# Patient Record
Sex: FEMALE | Race: BLACK OR AFRICAN AMERICAN | Employment: FULL TIME | URBAN - METROPOLITAN AREA
[De-identification: names, ages, dates, MRNs, and addresses within clinical notes are randomized per-mention and may not be internally consistent; named-entity substitution may affect disease eponyms.]

---

## 2017-07-17 ENCOUNTER — HOSPITAL ENCOUNTER (EMERGENCY)
Age: 27
Discharge: HOME | End: 2017-07-17
Attending: EMERGENCY MEDICINE
Payer: SELF-PAY

## 2017-07-17 VITALS
BODY MASS INDEX: 22.14 KG/M2 | TEMPERATURE: 98.2 F | HEART RATE: 74 BPM | OXYGEN SATURATION: 99 % | DIASTOLIC BLOOD PRESSURE: 70 MMHG | WEIGHT: 125 LBS | RESPIRATION RATE: 12 BRPM | SYSTOLIC BLOOD PRESSURE: 111 MMHG

## 2017-07-17 DIAGNOSIS — N76.0 BV (BACTERIAL VAGINOSIS): Primary | ICD-10-CM

## 2017-07-17 DIAGNOSIS — B96.89 BV (BACTERIAL VAGINOSIS): Primary | ICD-10-CM

## 2017-07-17 DIAGNOSIS — B37.31 YEAST VAGINITIS: ICD-10-CM

## 2017-07-17 DIAGNOSIS — N89.8 VAGINAL DISCHARGE: ICD-10-CM

## 2017-07-17 LAB
APPEARANCE UR: ABNORMAL
BACTERIA URNS QL MICRO: ABNORMAL /HPF
BILIRUB UR QL: NEGATIVE
C TRACH RRNA SPEC QL NAA+PROBE: NEGATIVE
COLOR UR: YELLOW
EPITH CASTS URNS QL MICRO: ABNORMAL /LPF (ref 0–5)
GLUCOSE UR STRIP.AUTO-MCNC: NEGATIVE MG/DL
HCG UR QL: NEGATIVE
HGB UR QL STRIP: NEGATIVE
KETONES UR QL STRIP.AUTO: NEGATIVE MG/DL
LEUKOCYTE ESTERASE UR QL STRIP.AUTO: ABNORMAL
N GONORRHOEA RRNA SPEC QL NAA+PROBE: NEGATIVE
NITRITE UR QL STRIP.AUTO: NEGATIVE
PH UR STRIP: 6 [PH] (ref 5–8)
PROT UR STRIP-MCNC: NEGATIVE MG/DL
RBC #/AREA URNS HPF: ABNORMAL /HPF (ref 0–5)
SERVICE CMNT-IMP: NORMAL
SP GR UR REFRACTOMETRY: 1.01 (ref 1–1.03)
SPECIMEN SOURCE: NORMAL
UROBILINOGEN UR QL STRIP.AUTO: 0.2 EU/DL (ref 0.2–1)
WBC URNS QL MICRO: ABNORMAL /HPF (ref 0–4)
WET PREP GENITAL: NORMAL

## 2017-07-17 PROCEDURE — 99284 EMERGENCY DEPT VISIT MOD MDM: CPT

## 2017-07-17 PROCEDURE — 96372 THER/PROPH/DIAG INJ SC/IM: CPT

## 2017-07-17 PROCEDURE — 74011250636 HC RX REV CODE- 250/636: Performed by: EMERGENCY MEDICINE

## 2017-07-17 PROCEDURE — 74011000250 HC RX REV CODE- 250: Performed by: EMERGENCY MEDICINE

## 2017-07-17 PROCEDURE — 87210 SMEAR WET MOUNT SALINE/INK: CPT | Performed by: EMERGENCY MEDICINE

## 2017-07-17 PROCEDURE — 74011250637 HC RX REV CODE- 250/637: Performed by: EMERGENCY MEDICINE

## 2017-07-17 PROCEDURE — 81025 URINE PREGNANCY TEST: CPT | Performed by: EMERGENCY MEDICINE

## 2017-07-17 PROCEDURE — 87491 CHLMYD TRACH DNA AMP PROBE: CPT | Performed by: EMERGENCY MEDICINE

## 2017-07-17 PROCEDURE — 81001 URINALYSIS AUTO W/SCOPE: CPT | Performed by: EMERGENCY MEDICINE

## 2017-07-17 RX ORDER — AZITHROMYCIN 250 MG/1
1000 TABLET, FILM COATED ORAL
Status: COMPLETED | OUTPATIENT
Start: 2017-07-17 | End: 2017-07-17

## 2017-07-17 RX ORDER — CLINDAMYCIN PHOSPHATE 20 MG/G
1 CREAM VAGINAL
Qty: 40 G | Refills: 0 | Status: SHIPPED | OUTPATIENT
Start: 2017-07-17 | End: 2017-07-24

## 2017-07-17 RX ADMIN — AZITHROMYCIN 1000 MG: 250 TABLET, FILM COATED ORAL at 03:25

## 2017-07-17 RX ADMIN — LIDOCAINE HYDROCHLORIDE 250 MG: 10 INJECTION, SOLUTION EPIDURAL; INFILTRATION; INTRACAUDAL; PERINEURAL at 03:24

## 2017-07-17 NOTE — ED NOTES
Patient states that she has received the rocephin shot and azithromycin in the past without any reaction.

## 2017-07-17 NOTE — ED TRIAGE NOTES
States she found out her significant was diagnosed with chlamydia; denies S/SX but states sensation in groin feels different without discomfort.   Has white discharge

## 2017-07-17 NOTE — DISCHARGE INSTRUCTIONS
Bacterial Vaginosis: Care Instructions  Your Care Instructions    Bacterial vaginosis is a type of vaginal infection. It is caused by excess growth of certain bacteria that are normally found in the vagina. Symptoms can include itching, swelling, pain when you urinate or have sex, and a gray or yellow discharge with a \"fishy\" odor. It is not considered an infection that is spread through sexual contact. Although symptoms can be annoying and uncomfortable, bacterial vaginosis does not usually cause other health problems. However, if you have it while you are pregnant, it can cause complications. While the infection may go away on its own, most doctors use antibiotics to treat it. You may have been prescribed pills or vaginal cream. With treatment, bacterial vaginosis usually clears up in 5 to 7 days. Follow-up care is a key part of your treatment and safety. Be sure to make and go to all appointments, and call your doctor if you are having problems. It's also a good idea to know your test results and keep a list of the medicines you take. How can you care for yourself at home? · Take your antibiotics as directed. Do not stop taking them just because you feel better. You need to take the full course of antibiotics. · Do not eat or drink anything that contains alcohol if you are taking metronidazole (Flagyl). · Keep using your medicine if you start your period. Use pads instead of tampons while using a vaginal cream or suppository. Tampons can absorb the medicine. · Wear loose cotton clothing. Do not wear nylon and other materials that hold body heat and moisture close to the skin. · Do not scratch. Relieve itching with a cold pack or a cool bath. · Do not wash your vaginal area more than once a day. Use plain water or a mild, unscented soap. Do not douche. When should you call for help?   Watch closely for changes in your health, and be sure to contact your doctor if:  · You have unexpected vaginal bleeding. · You have a fever. · You have new or increased pain in your vagina or pelvis. · You are not getting better after 1 week. · Your symptoms return after you finish the course of your medicine. Where can you learn more? Go to http://raya-china.info/. Tod Holly in the search box to learn more about \"Bacterial Vaginosis: Care Instructions. \"  Current as of: October 13, 2016  Content Version: 11.3  © 8653-4943 [employer]. Care instructions adapted under license by [employer] (which disclaims liability or warranty for this information). If you have questions about a medical condition or this instruction, always ask your healthcare professional. [address] any warranty or liability for your use of this information. Candidiasis: Care Instructions  Your Care Instructions  Candidiasis (say \"rtr-bwn-YH-uh-onelia\") is a yeast infection. Yeast normally lives in your body. But it can cause problems if your body's defenses don't work as they should. Some medicines can increase your chance of getting a yeast infection. These include antibiotics, steroids, and cancer drugs. And some diseases like AIDS and diabetes can make you more likely to get yeast infections. There are different types of yeast infections. Areli Epp is a yeast infection in the mouth. It usually occurs in people with weak immune systems. It causes white patches inside the mouth and throat. Yeast infections of the skin usually occur in skin folds where the skin stays moist. They cause red, oozing patches on your skin. Babies can get these infections under the diaper. People who often wear gloves can get them on their hands. Many women get vaginal yeast infections. They are most common when women take antibiotics. These infections can cause the vagina to itch and burn. They also cause white discharge that looks like cottage cheese. In rare cases, yeast infects the blood. This can cause serious disease. This kind of infection is treated with medicine given through a needle into a vein (IV). After you start treatment, a yeast infection usually goes away quickly. But if your immune system is weak, the infection may come back. Tell your doctor if you get yeast infections often. Follow-up care is a key part of your treatment and safety. Be sure to make and go to all appointments, and call your doctor if you are having problems. It's also a good idea to know your test results and keep a list of the medicines you take. How can you care for yourself at home? · Take your medicines exactly as prescribed. Call your doctor if you think you are having a problem with your medicine. · Use antibiotics only as directed by your doctor. · Eat yogurt with live cultures. It has bacteria called lactobacillus. It may help prevent some types of yeast infections. · Keep your skin clean and dry. Put powder on moist places. · If you are using a cream or suppository to treat a vaginal yeast infection, don't use condoms or a diaphragm. Use a different type of birth control. · Eat a healthy diet and get regular exercise. This will help keep your immune system strong. When should you call for help? Call your doctor now or seek immediate medical care if:  · You have a fever. · You are pregnant and have signs of a vaginal or urinary tract infection such as:  ¨ Severe itching in your vagina. ¨ Pain during sex or when you urinate. ¨ Unusual discharge from your vagina. ¨ A frequent urge to urinate. ¨ Urine that is cloudy or smells bad. Watch closely for changes in your health, and be sure to contact your doctor if:  · You do not get better as expected. Where can you learn more? Go to http://raya-china.info/. Enter G578 in the search box to learn more about \"Candidiasis: Care Instructions. \"  Current as of: October 13, 2016  Content Version: 11.3  © 5650-0033 [employer]. Care instructions adapted under license by [employer] (which disclaims liability or warranty for this information). If you have questions about a medical condition or this instruction, always ask your healthcare professional. [address] any warranty or liability for your use of this information.

## 2017-07-17 NOTE — ED PROVIDER NOTES
HPI Comments: 2:44 AM: Hailey Farley is a 24 y.o. female presenting to the ED after finding out that her sexual partner was diagnosed with an STD. Pt states she has \"been feeling weird\". States she was previously diagnosed with chlamydia and was treated. She admits to mild vaginal discharge with no odor. Denies exacerbating or relieving factors of sx. Denies fever, chills, CP, SOB, abd pain, nausea, vomiting, diarrhea, headache, coughing. Patient is a 24 y.o. female presenting with STD exposure. Exposure to STD   Associated symptoms include vaginal discharge. Pertinent negatives include no nausea and no abdominal pain. Past Medical History:   Diagnosis Date    Anemia     Ectopic pregnancy     Miscarriage        History reviewed. No pertinent surgical history. Family History:   Problem Relation Age of Onset    Heart Disease Mother     Asthma Mother     Hypertension Father        Social History     Social History    Marital status: SINGLE     Spouse name: N/A    Number of children: N/A    Years of education: N/A     Occupational History    Not on file. Social History Main Topics    Smoking status: Current Some Day Smoker     Packs/day: 0.25    Smokeless tobacco: Not on file    Alcohol use Yes      Comment: social    Drug use: No    Sexual activity: Yes     Birth control/ protection: None     Other Topics Concern    Not on file     Social History Narrative         ALLERGIES: Amoxicillin; Neosporin [neomycin-bacitracin-polymyxin]; Pcn [penicillins]; and Sulfa (sulfonamide antibiotics)    Review of Systems   Constitutional: Negative for fever. HENT: Negative for sore throat. Eyes: Negative for redness. Respiratory: Negative for shortness of breath and wheezing. Gastrointestinal: Negative for abdominal pain and nausea. Genitourinary: Positive for vaginal discharge. Negative for dysuria. Musculoskeletal: Negative for neck stiffness. Skin: Negative for pallor. Neurological: Negative for headaches. Hematological: Does not bruise/bleed easily. All other systems reviewed and are negative. Vitals:    07/17/17 0235   BP: 111/70   Pulse: 74   Resp: 12   Temp: 98.2 °F (36.8 °C)   SpO2: 99%   Weight: 56.7 kg (125 lb)            Physical Exam   Constitutional: She is oriented to person, place, and time. She appears well-developed and well-nourished. No distress. HENT:   Head: Normocephalic and atraumatic. Mouth/Throat: Oropharynx is clear and moist.   Eyes: Conjunctivae are normal. Pupils are equal, round, and reactive to light. No scleral icterus. Neck: Normal range of motion. Neck supple. Cardiovascular: Intact distal pulses. Capillary refill < 3 seconds   Pulmonary/Chest: Effort normal and breath sounds normal. No respiratory distress. She has no wheezes. Abdominal: Soft. Bowel sounds are normal. She exhibits no distension. There is no tenderness. Musculoskeletal: Normal range of motion. She exhibits no edema. Lymphadenopathy:     She has no cervical adenopathy. Neurological: She is alert and oriented to person, place, and time. No cranial nerve deficit. Skin: Skin is warm and dry. She is not diaphoretic. Nursing note and vitals reviewed. MDM  Number of Diagnoses or Management Options  BV (bacterial vaginosis):   Vaginal discharge:   Yeast vaginitis:   Diagnosis management comments: Exposure to STD    Pelvic exam, UA, gyn cultures    Pt is allergic amoxicillin and neosporin. According to Saleem Aly notes she gets hives with medications. She has gotten Rocephin in the past with no reaction. Will give a dose of Rocephin in the ED. Advised pt to follow up regarding cultures. If cultures are positive, partners must be treated. Pt would like to be treated pending cultures. Pt is breastfeeding. I called [facility] Pharmacist, Mayelin Barrett,  to discuss appropriate yeast infection and BV meds to prescribe.  Can use nystatin vaginal and clindamycin vaginal    I have reassessed the patient. I have discussed the workup, results and plan with the patient and patient is in agreement. Patient is feeling better. Patient will be prescribed nystatin vaginal and clindamycin vaginal.  Patient was discharge in stable condition. Patient was given outpatient follow up. Patient is to return to emergency department if any new or worsening condition.           Amount and/or Complexity of Data Reviewed  Clinical lab tests: ordered and reviewed  Tests in the medicine section of CPT®: ordered and reviewed    Patient Progress  Patient progress: improved    ED Course     Vitals:  Patient Vitals for the past 12 hrs:   Temp Pulse Resp BP SpO2   07/17/17 0235 98.2 °F (36.8 °C) 74 12 111/70 99 %       Medications Ordered:  Medications   cefTRIAXone (ROCEPHIN) 250 mg in lidocaine (PF) (XYLOCAINE) 10 mg/mL (1 %) IM injection (250 mg IntraMUSCular Given 7/17/17 0324)   azithromycin (ZITHROMAX) tablet 1,000 mg (1,000 mg Oral Given 7/17/17 0325)         Lab Findings:  Recent Results (from the past 12 hour(s))   URINALYSIS W/ RFLX MICROSCOPIC    Collection Time: 07/17/17  2:30 AM   Result Value Ref Range    Color YELLOW      Appearance CLOUDY      Specific gravity 1.011 1.005 - 1.030      pH (UA) 6.0 5.0 - 8.0      Protein NEGATIVE  NEG mg/dL    Glucose NEGATIVE  NEG mg/dL    Ketone NEGATIVE  NEG mg/dL    Bilirubin NEGATIVE  NEG      Blood NEGATIVE  NEG      Urobilinogen 0.2 0.2 - 1.0 EU/dL    Nitrites NEGATIVE  NEG      Leukocyte Esterase TRACE (A) NEG     HCG URINE, QL    Collection Time: 07/17/17  2:30 AM   Result Value Ref Range    HCG urine, Ql. NEGATIVE  NEG     URINE MICROSCOPIC ONLY    Collection Time: 07/17/17  2:30 AM   Result Value Ref Range    WBC 0 to 3 0 - 4 /hpf    RBC 0 to 3 0 - 5 /hpf    Epithelial cells 3+ 0 - 5 /lpf    Bacteria 1+ (A) NEG /hpf   WET PREP    Collection Time: 07/17/17  3:00 AM   Result Value Ref Range    Special Requests: NO SPECIAL REQUESTS      Wet prep MANY  CLUE CELLS PRESENT        Wet prep MODERATE  YEAST        Wet prep FEW  YEAST WITH PSEUDOHYPHAE        Wet prep NO TRICHOMONAS SEEN         Reevaluation of the patient:    3:54 AM: Discussed all results with pt. Discussed Rx instructions, F/U instructions, and red flags for return to the ED. Pt verbalized understanding     Diagnosis:   1. BV (bacterial vaginosis)    2. Yeast vaginitis    3. Vaginal discharge        Disposition: Discharge home    Follow-up Information     Follow up With Details Comments [address], [place] Call in 2 days  [id number] [facility] [address] [facility]      [address] [facility] EMERGENCY DEPT  If symptoms worsen [address] [facility]  [phone]          Patient's Medications    No medications on file       Pelvic Exam  Date/Time: 7/17/2017 2:55 AM  Performed by: attending  Procedure duration:  5 minutes. Documented by:  Mahendra Rea. As dictated by:  Dr. Surendra Moe DO  Exam assisted by:  Han Cam. External genitalia appearance: normal.    Vaginal exam:  discharge. The amount of discharge was:  mild. The discharge was white and cottage cheese like. Cervical exam:  no cervical motion tenderness (Not red). Specimen(s) collected:  chlamydia and GC. Bimanual exam:  normal.    Patient tolerance: Patient tolerated the procedure well with no immediate complications            Mahendra COLE, acting as a scribe for and in the presence of Dr. Surendra Moe DO    Provider Attestation  I personally performed the services described in the documentation, reviewed the documentation, as recorded by the scribe in my presence, and it accurately and completely records my words and actions.      Signed By: Janina Orona scribe for Dr. Surendra Moe DO

## 2018-02-22 ENCOUNTER — OFFICE VISIT (OUTPATIENT)
Dept: FAMILY MEDICINE CLINIC | Age: 28
End: 2018-02-22

## 2018-02-22 ENCOUNTER — HOSPITAL ENCOUNTER (OUTPATIENT)
Dept: LAB | Age: 28
Discharge: HOME | End: 2018-02-22
Payer: MEDICAID

## 2018-02-22 VITALS
RESPIRATION RATE: 16 BRPM | DIASTOLIC BLOOD PRESSURE: 75 MMHG | HEART RATE: 61 BPM | WEIGHT: 118.6 LBS | BODY MASS INDEX: 20.25 KG/M2 | TEMPERATURE: 98 F | OXYGEN SATURATION: 100 % | HEIGHT: 64 IN | SYSTOLIC BLOOD PRESSURE: 123 MMHG

## 2018-02-22 DIAGNOSIS — Z02.1 DRUG SCREENING, PRE-EMPLOYMENT: ICD-10-CM

## 2018-02-22 DIAGNOSIS — Z11.3 SCREENING FOR STD (SEXUALLY TRANSMITTED DISEASE): ICD-10-CM

## 2018-02-22 DIAGNOSIS — Z02.1 PRE-EMPLOYMENT HEALTH SCREENING EXAMINATION: Primary | ICD-10-CM

## 2018-02-22 DIAGNOSIS — Z02.1 PRE-EMPLOYMENT HEALTH SCREENING EXAMINATION: ICD-10-CM

## 2018-02-22 LAB
ALBUMIN SERPL-MCNC: 4.4 G/DL (ref 3.4–5)
ALBUMIN/GLOB SERPL: 1.3 {RATIO} (ref 0.8–1.7)
ALP SERPL-CCNC: 63 U/L (ref 45–117)
ALT SERPL-CCNC: 21 U/L (ref 13–56)
ANION GAP SERPL CALC-SCNC: 9 MMOL/L (ref 3–18)
AST SERPL-CCNC: 20 U/L (ref 15–37)
BASOPHILS # BLD: 0 K/UL (ref 0–0.06)
BASOPHILS NFR BLD: 1 % (ref 0–2)
BILIRUB SERPL-MCNC: 0.9 MG/DL (ref 0.2–1)
BUN SERPL-MCNC: 14 MG/DL (ref 7–18)
BUN/CREAT SERPL: 18 (ref 12–20)
CALCIUM SERPL-MCNC: 9.2 MG/DL (ref 8.5–10.1)
CHLORIDE SERPL-SCNC: 105 MMOL/L (ref 100–108)
CO2 SERPL-SCNC: 25 MMOL/L (ref 21–32)
CREAT SERPL-MCNC: 0.76 MG/DL (ref 0.6–1.3)
DIFFERENTIAL METHOD BLD: ABNORMAL
EOSINOPHIL # BLD: 0.2 K/UL (ref 0–0.4)
EOSINOPHIL NFR BLD: 4 % (ref 0–5)
ERYTHROCYTE [DISTWIDTH] IN BLOOD BY AUTOMATED COUNT: 13.6 % (ref 11.6–14.5)
GLOBULIN SER CALC-MCNC: 3.5 G/DL (ref 2–4)
GLUCOSE SERPL-MCNC: 72 MG/DL (ref 74–99)
HCT VFR BLD AUTO: 40 % (ref 35–45)
HGB BLD-MCNC: 12.7 G/DL (ref 12–16)
LYMPHOCYTES # BLD: 3.2 K/UL (ref 0.9–3.6)
LYMPHOCYTES NFR BLD: 57 % (ref 21–52)
MCH RBC QN AUTO: 29.3 PG (ref 24–34)
MCHC RBC AUTO-ENTMCNC: 31.8 G/DL (ref 31–37)
MCV RBC AUTO: 92.4 FL (ref 74–97)
MONOCYTES # BLD: 0.4 K/UL (ref 0.05–1.2)
MONOCYTES NFR BLD: 7 % (ref 3–10)
NEUTS SEG # BLD: 1.7 K/UL (ref 1.8–8)
NEUTS SEG NFR BLD: 31 % (ref 40–73)
PLATELET # BLD AUTO: 367 K/UL (ref 135–420)
PMV BLD AUTO: 9.5 FL (ref 9.2–11.8)
POTASSIUM SERPL-SCNC: 3.9 MMOL/L (ref 3.5–5.5)
PROT SERPL-MCNC: 7.9 G/DL (ref 6.4–8.2)
RBC # BLD AUTO: 4.33 M/UL (ref 4.2–5.3)
RPR SER QL: NONREACTIVE
SODIUM SERPL-SCNC: 139 MMOL/L (ref 136–145)
WBC # BLD AUTO: 5.5 K/UL (ref 4.6–13.2)

## 2018-02-22 PROCEDURE — 86592 SYPHILIS TEST NON-TREP QUAL: CPT | Performed by: PHYSICIAN ASSISTANT

## 2018-02-22 PROCEDURE — 85025 COMPLETE CBC W/AUTO DIFF WBC: CPT | Performed by: PHYSICIAN ASSISTANT

## 2018-02-22 PROCEDURE — 87389 HIV-1 AG W/HIV-1&-2 AB AG IA: CPT | Performed by: PHYSICIAN ASSISTANT

## 2018-02-22 PROCEDURE — 80307 DRUG TEST PRSMV CHEM ANLYZR: CPT | Performed by: PHYSICIAN ASSISTANT

## 2018-02-22 PROCEDURE — 80053 COMPREHEN METABOLIC PANEL: CPT | Performed by: PHYSICIAN ASSISTANT

## 2018-02-22 NOTE — PROGRESS NOTES
Subjective:   24 y.o. female for Well Woman Check. She is a new patient. Her gyne and breast care is done elsewhere by her Ob-Gyn physician. 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 She has a hx of iron deficiency anemia when she was under [employer]. Otherwise a healthy young lady. She denies tobacco use, occasional smoking as a teen. She drinking alcohol socially when she goes out. Last week she had about 5 shots of liquor. She needs a physical today along with a drug screening. She has no complaints at today's visit. There is no problem list on file for this patient. There are no active problems to display for this patient. Allergies   Allergen Reactions    Banana Anaphylaxis    Amoxicillin Hives    Neosporin [Hydrocortisone] Hives    Pcn [Penicillins] Hives    Sulfa (Sulfonamide Antibiotics) Swelling     No past medical history on file. No past surgical history on file. No family history on file. Social History   Substance Use Topics    Smoking status: Former Smoker    Smokeless tobacco: Never Used      Comment: ocassionally as a teen    Alcohol use 3.0 oz/week     5 Shots of liquor per week        ROS: Feeling generally well. No TIA's or unusual headaches, no dysphagia. No prolonged cough. No dyspnea or chest pain on exertion. No abdominal pain, change in bowel habits, black or bloody stools. No urinary tract symptoms. No new or unusual musculoskeletal symptoms. Specific concerns today: None. Objective: The patient appears well, alert, oriented x 3, in no distress. Visit Vitals    /75 (BP 1 Location: Left arm, BP Patient Position: Sitting)    Pulse 61    Temp 98 °F (36.7 °C) (Oral)    Resp 16    Ht 5' 3.5\" (1.613 m)    Wt 118 lb 9.6 oz (53.8 kg)    LMP 01/28/2018 (Approximate)    SpO2 100%  Comment: ra    BMI 20.68 kg/m2     ENT normal. Multiple peircings on nose, lips and ears. Neck supple.  No adenopathy or thyromegaly. KHANH. Lungs are clear, good air entry, no wheezes, rhonchi or rales. S1 and S2 normal, no murmurs, regular rate and rhythm. Abdomen soft without tenderness, guarding, mass or organomegaly. Extremities show no edema, normal peripheral pulses. Neurological is normal, no focal findings. Psych: mood and affect are normal.   Breast and Pelvic exams are deferred. PHQ over the last two weeks 2/22/2018   Little interest or pleasure in doing things Not at all   Feeling down, depressed or hopeless Not at all   Total Score PHQ 2 0       Assessment/Plan:       ICD-10-CM ICD-9-CM    1. Pre-employment health screening examination Z02.1 V70.5 CBC WITH AUTOMATED DIFF      METABOLIC PANEL, COMPREHENSIVE      HIV 1/2 AG/AB, 4TH GENERATION,W RFLX CONFIRM      RPR      13-DRUG SCREEN, UR   2. Screening for STD (sexually transmitted disease) Z11.3 V74.5 CBC WITH AUTOMATED DIFF      METABOLIC PANEL, COMPREHENSIVE      HIV 1/2 AG/AB, 4TH GENERATION,W RFLX CONFIRM      RPR   3. Drug screening, pre-employment Z02.1 V70.5 13-DRUG SCREEN, UR     Labs and UDS done today. Will complete physical form once results are available. Follow-up Disposition:  Return if symptoms worsen or fail to improve.     Katie Reed PA-C  02/22/18

## 2018-02-22 NOTE — PATIENT INSTRUCTIONS
Complete Blood Count (CBC): About This Test  What is it? A complete blood count (CBC) is a blood test that gives important information about your blood cells, especially red blood cells, white blood cells, and platelets. Why is this test done? A CBC may be done as part of a regular physical exam. There are many other reasons that a doctor may want this blood test, including to:  · Find the cause of symptoms such as fatigue, weakness, fever, bruising, or weight loss. · Find anemia or an infection. · See how much blood has been lost if there is bleeding. · Diagnose diseases of the blood, such as leukemia or polycythemia. How can you prepare for the test?  You do not need to do anything before having this test.  What happens during the test?  The health professional taking a sample of your blood will:  · Wrap an elastic band around your upper arm. This makes the veins below the band larger so it is easier to put a needle into the vein. · Clean the needle site with alcohol. · Put the needle into the vein. · Attach a tube to the needle to fill it with blood. · Remove the band from your arm when enough blood is collected. · Put a gauze pad or cotton ball over the needle site as the needle is removed. · Put pressure on the site and then put on a bandage. If this blood test is done on a baby, a heel stick may be done instead of a blood draw from a vein. What happens after the test?  · You will probably be able to go home right away. · You can go back to your usual activities right away. Follow-up care is a key part of your treatment and safety. Be sure to make and go to all appointments, and call your doctor if you are having problems. It's also a good idea to keep a list of the medicines you take. Ask your doctor when you can expect to have your test results. Where can you learn more? Go to http://raya-china.info/.   Enter G840 in the search box to learn more about \"Complete Blood Count (CBC): About This Test.\"  Current as of: October 14, 2016  Content Version: 11.4  © 0910-3445 [employer]. Care instructions adapted under license by [employer] (which disclaims liability or warranty for this information). If you have questions about a medical condition or this instruction, always ask your healthcare professional. [address] any warranty or liability for your use of this information. Comprehensive Metabolic Panel: About This Test  What is it? This blood test measures your sugar (glucose) level, electrolyte and fluid balance, kidney function, and liver function. Why is this test done? Your doctor may order this test as part of a regular health exam. Your doctor may use this test to check on a medical condition, such as high blood pressure, or to help diagnose a medical condition, such as diabetes. How can you prepare for the test?  · Your doctor may ask you not to eat or drink anything other than water for at least 8 hours before the blood sample is taken. What happens during the test?  · A health professional takes a sample of your blood. What else should you know about the test?  · Your results will include an explanation of what a \"normal\" result is. This is called a \"reference range. \" It is just a guide. Your doctor will evaluate your results based on your health and other factors. This means that a value that falls outside the normal values listed may still be normal for you. How long does the test take? · The test will take a few minutes. What happens after the test?  · You will probably be able to go home right away. · You can go back to your usual activities right away. Follow-up care is a key part of your treatment and safety. Be sure to make and go to all appointments, and call your doctor if you are having problems. It's also a good idea to keep a list of the medicines you take.  Ask your doctor when you can expect to have your test results. Where can you learn more? Go to http://raya-china.info/. Enter [id number] in the search box to learn more about \"Comprehensive Metabolic Panel: About This Test.\"  Current as of: October 14, 2016  Content Version: 11.4  © 4400-0321 [employer]. Care instructions adapted under license by [employer] (which disclaims liability or warranty for this information). If you have questions about a medical condition or this instruction, always ask your healthcare professional. [address] any warranty or liability for your use of this information.

## 2018-02-23 LAB
AMPHETAMINES UR QL SCN: NEGATIVE NG/ML
BARBITURATES UR QL: NEGATIVE NG/ML
BENZODIAZ UR QL: NEGATIVE NG/ML
BZE UR QL: NEGATIVE NG/ML
CANNABINOIDS UR QL SCN: NEGATIVE NG/ML
CREAT UR-MCNC: 100.4 MG/DL (ref 20–300)
ETHANOL UR-MCNC: NEGATIVE %
HIV 1+2 AB+HIV1 P24 AG SERPL QL IA: NONREACTIVE
HIV12 RESULT COMMENT, HHIVC: NORMAL
MEPERIDINE UR QL: NEGATIVE NG/ML
METHADONE UR QL: NEGATIVE NG/ML
OPIATES UR QL SCN: NEGATIVE NG/ML
OXYCODONE+OXYMORPHONE UR QL SCN: NEGATIVE NG/ML
PCP UR QL: NEGATIVE NG/ML
PROPOXYPH UR QL: NEGATIVE NG/ML
TRAMADOL UR QL SCN: NEGATIVE NG/ML

## 2018-02-28 NOTE — PROGRESS NOTES
Patient aware of lab results and expressed verbal understanding. She also said she has additional paper work that she forgot to leave at her previous appointment. I asked her, if she is able, to take it to our office today and when Deedee Boogie gets back in the office tomorrow, we will get all paper work completed and call her when it is ready.

## 2018-03-02 NOTE — PROGRESS NOTES
Left message on patients voicemail to return call.  Her paperwork is ready except for a color vision test.

## 2018-07-17 ENCOUNTER — CLINICAL SUPPORT (OUTPATIENT)
Dept: FAMILY MEDICINE CLINIC | Age: 28
End: 2018-07-17

## 2018-07-17 ENCOUNTER — OFFICE VISIT (OUTPATIENT)
Dept: FAMILY MEDICINE CLINIC | Age: 28
End: 2018-07-17

## 2018-07-17 DIAGNOSIS — Z23 NEED FOR HEPATITIS A AND B VACCINATION: Primary | ICD-10-CM

## 2018-07-17 DIAGNOSIS — Z23 ENCOUNTER FOR IMMUNIZATION: Primary | ICD-10-CM

## 2018-07-17 NOTE — PATIENT INSTRUCTIONS
Hepatitis A Vaccine: What You Need to Know  Why get vaccinated? Hepatitis A is a serious liver disease. It is caused by the hepatitis A virus (HAV). HAV is spread from person to person through contact with the feces (stool) of people who are infected, which can easily happen if someone does not wash his or her hands properly. You can also get hepatitis A from food, water, or objects contaminated with HAV. Symptoms of hepatitis A can include:  · Fever, fatigue, loss of appetite, nausea, vomiting, and/or joint pain. · Severe stomach pains and diarrhea (mainly in children). · Jaundice (yellow skin or eyes, dark urine, james-colored bowel movements). These symptoms usually appear 2 to 6 weeks after exposure and usually last less than 2 months, although some people can be ill for as long as 6 months. If you have hepatitis A, you may be too ill to work. Children often do not have symptoms, but most adults do. You can spread HAV without having symptoms. Hepatitis A can cause liver failure and death, although this is rare and occurs more commonly in persons 48years of age or older and persons with other liver diseases, such as hepatitis B or C. Hepatitis A vaccine can prevent hepatitis A. Hepatitis A vaccines were recommended in the [facility] beginning in 1996. Since then, the number of cases reported each year in the [place] has dropped from around 31,000 cases to fewer than 1,500 cases. Hepatitis A vaccine  Hepatitis A vaccine is an inactivated (killed) vaccine. You will need 2 doses for long-lasting protection. These doses should be given at least 6 months apart. Children are routinely vaccinated between their first and second birthdays (15 through 22 months of age). Older children and adolescents can get the vaccine after 23 months. Adults who have not been vaccinated previously and want to be protected against hepatitis A can also get the vaccine.   You should get hepatitis A vaccine if you:  · Are traveling to countries where hepatitis A is common. · Are a man who has sex with other men. · Use illegal drugs. · Have a chronic liver disease such as hepatitis B or hepatitis C.  · Are being treated with clotting-factor concentrates. · Work with hepatitis A-infected animals or in a hepatitis A research laboratory. · Expect to have close personal contact with an international adoptee from a country where hepatitis A is common. Ask your healthcare provider if you want more information about any of these groups. There are no known risks to getting hepatitis A vaccine at the same time as other vaccines. Some people should not get this vaccine  Tell the person who is giving you the vaccine:  · If you have any severe, life-threatening allergies. If you ever had a life-threatening allergic reaction after a dose of hepatitis A vaccine, or have a severe allergy to any part of this vaccine, you may be advised not to get vaccinated. Ask your health care provider if you want information about vaccine components. · If you are not feeling well. If you have a mild illness, such as a cold, you can probably get the vaccine today. If you are moderately or severely ill, you should probably wait until you recover. Your doctor can advise you. Risks of a vaccine reaction  With any medicine, including vaccines, there is a chance of side effects. These are usually mild and go away on their own, but serious reactions are also possible. Most people who get hepatitis A vaccine do not have any problems with it. Minor problems following hepatitis A vaccine include:  · Soreness or redness where the shot was given  · Low-grade fever  · Headache  · Tiredness  If these problems occur, they usually begin soon after the shot and last 1 or 2 days. Your doctor can tell you more about these reactions. Other problems that could happen after this vaccine:  · People sometimes faint after a medical procedure, including vaccination. Sitting or lying down for about 15 minutes can help prevent fainting, and injuries caused by a fall. Tell your provider if you feel dizzy, or have vision changes or ringing in the ears. · Some people get shoulder pain that can be more severe and longer lasting than the more routine soreness that can follow injections. This happens very rarely. · Any medication can cause a severe allergic reaction. Such reactions from a vaccine are very rare, estimated at about 1 in a million doses, and would happen within a few minutes to a few hours after the vaccination. As with any medicine, there is a very remote chance of a vaccine causing a serious injury or death. The safety of vaccines is always being monitored. For more information, visit: www.cdc.gov/vaccinesafety. What if there is a serious problem? What should I look for? · Look for anything that concerns you, such as signs of a severe allergic reaction, very high fever, or unusual behavior. Signs of a severe allergic reaction can include hives, swelling of the face and throat, difficulty breathing, a fast heartbeat, dizziness, and weakness. These would usually start a few minutes to a few hours after the vaccination. What should I do? · If you think it is a severe allergic reaction or other emergency that can't wait, call call 911and get to the nearest hospital. Otherwise, call your clinic. · Afterward, the reaction should be reported to the Vaccine Adverse Event Reporting System (VAERS). Your doctor should file this report, or you can do it yourself through the VAERS web site at www.vaers. hhs.gov, or by calling 0-841.864.7323. VAERS does not give medical advice. The National Vaccine Injury Compensation Program  The National Vaccine Injury Compensation Program (VICP) is a federal program that was created to compensate people who may have been injured by certain vaccines.   Persons who believe they may have been injured by a vaccine can learn about the program and about filing a claim by calling 2-260.359.3742 or visiting the [employer] website at www.[facility].gov/vaccinecompensation. There is a time limit to file a claim for compensation. How can I learn more? · Ask your healthcare provider. He or she can give you the vaccine package insert or suggest other sources of information. · Call your local or state health department. · Contact the Centers for Disease Control and Prevention (CDC):  ¨ Call 3-684.961.2003 (1-800-CDC-INFO). ¨ Visit CDC's website at www.cdc.gov/vaccines. Vaccine Information Statement  Hepatitis A Vaccine  7/20/2016  42 U. S.C. § 300aa-26  [place] S. Department of Health and Human Services  Centers for Disease Control and Prevention  Many Vaccine Information Statements are available in Chinese and other languages. See www.immunize.org/vis. Hojas de información sobre vacunas están disponibles en español y en otros idiomas. Visite www.immunize.org/vis. Care instructions adapted under license by [employer] (which disclaims liability or warranty for this information). If you have questions about a medical condition or this instruction, always ask your healthcare professional. [place] [address] any warranty or liability for your use of this information. Hepatitis B Vaccine: What You Need to Know  Why get vaccinated? Hepatitis B is a serious disease that affects the liver. It is caused by the hepatitis B virus. Hepatitis B can cause mild illness lasting a few weeks, or it can lead to a serious, lifelong illness. Hepatitis B virus infection can be either acute or chronic. Acute hepatitis B virus infection is a short-term illness that occurs within the first 6 months after someone is exposed to the hepatitis B virus.  This can lead to:  · fever, fatigue, loss of appetite, nausea, and/or vomiting  · jaundice (yellow skin or eyes, dark urine, james-colored bowel movements)  · pain in muscles, joints, and stomach  Chronic hepatitis B virus infection is a long-term illness that occurs when the hepatitis B virus remains in a person's body. Most people who go on to develop chronic hepatitis B do not have symptoms, but it is still very serious and can lead to:  · liver damage (cirrhosis)  · liver cancer  · death  Chronically-infected people can spread hepatitis B virus to others, even if they do not feel or look sick themselves. Up to 1.4 million people in the [place] may have chronic hepatitis B infection. About 90% of infants who get hepatitis B become chronically infected and about 1 out of 4 of them dies. Hepatitis B is spread when blood, semen, or other body fluid infected with the Hepatitis B virus enters the body of a person who is not infected. People can become infected with the virus through:  · Birth (a baby whose mother is infected can be infected at or after birth)  · Sharing items such as razors or toothbrushes with an infected person  · Contact with the blood or open sores of an infected person  · Sex with an infected partner  · Sharing needles, syringes, or other drug-injection equipment  · Exposure to blood from needlesticks or other sharp instruments  Each year about 2,000 people in the [employer] die from hepatitis B-related liver disease. Hepatitis B vaccine can prevent hepatitis B and its consequences, including liver cancer and cirrhosis. Hepatitis B vaccine  Hepatitis B vaccine is made from parts of the hepatitis B virus. It cannot cause hepatitis B infection. The vaccine is usually given as 3 or 4 shots over a 6-month period. Infants should get their first dose of hepatitis B vaccine at birth and will usually complete the series at 7 months of age. All children and adolescents younger than 23years of age who have not yet gotten the vaccine should also be vaccinated.   Hepatitis B vaccine is recommended for unvaccinated adults who are at risk for hepatitis B virus infection, including:  · People whose sex partners have hepatitis  · Sexually active persons who are not in a long-term monogamous relationship  · Persons seeking evaluation or treatment for a sexually transmitted disease  · Men who have sexual contact with other men  · People who share needles, syringes, or other drug-injection equipment  · People who have household contact with someone infected with the hepatitis B virus  · Health care and public safety workers at risk for exposure to blood or body fluids  · Residents and staff of facilities for developmentally disabled persons  · Persons in correctional facilities  · Victims of sexual assault or abuse  · Travelers to regions with increased rates of hepatitis B  · People with chronic liver disease, kidney disease, HIV infection, or diabetes  · Anyone who wants to be protected from hepatitis B  There are no known risks to getting hepatitis B vaccine at the same time as other vaccines. Some people should not get this vaccine. Tell the person who is giving the vaccine:  · If the person getting the vaccine has any severe, life-threatening allergies. If you ever had a life-threatening allergic reaction after a dose of hepatitis B vaccine, or have a severe allergy to any part of this vaccine, you may be advised not to get vaccinated. Ask your health care provider if you want information about vaccine components. · If the person getting the vaccine is not feeling well. If you have a mild illness, such as a cold, you can probably get the vaccine today. If you are moderately or severely ill, you should probably wait until you recover. Your doctor can advise you. Risks of a vaccine reaction  With any medicine, including vaccines, there is a chance of side effects. These are usually mild and go away on their own, but serious reactions are also possible. Most people who get hepatitis B vaccine do not have any problems with it.   Minor problems following hepatitis B vaccine include:  · soreness where the shot was given  · temperature of 99.9°F or higher  If these problems occur, they usually begin soon after the shot and last 1 or 2 days. Your doctor can tell you more about these reactions. Other problems that could happen after this vaccine:  · People sometimes faint after a medical procedure, including vaccination. Sitting or lying down for about 15 minutes can help prevent fainting and injuries caused by a fall. Tell your provider if you feel dizzy, or have vision changes or ringing in the ears. · Some people get shoulder pain that can be more severe and longer-lasting than the more routine soreness that can follow injections. This happens very rarely. · Any medication can cause a severe allergic reaction. Such reactions from a vaccine are very rare, estimated at about 1 in a million doses, and would happen within a few minutes to a few hours after the vaccination. As with any medicine, there is a very remote chance of a vaccine causing a serious injury or death. The safety of vaccines is always being monitored. For more information, visit: www.cdc.gov/vaccinesafety/  What if there is a serious problem? What should I look for? · Look for anything that concerns you, such as signs of a severe allergic reaction, very high fever, or unusual behavior. Signs of a severe allergic reaction can include hives, swelling of the face and throat, difficulty breathing, a fast heartbeat, dizziness, and weakness. These would usually start a few minutes to a few hours after the vaccination. What should I do? · If you think it is a severe allergic reaction or other emergency that can't wait, call 9-1-1 or get the person to the nearest hospital. Otherwise, call your clinic  Afterward, the reaction should be reported to the Vaccine Adverse Event Reporting System (VAERS). Your doctor should file this report, or you can do it yourself through the VAERS web site at www.vaers. [facility].gov, or by calling 6-986.670.2295.   [employer] does not give medical advice. The National Vaccine Injury Compensation Program  The National Vaccine Injury Compensation Program (VICP) is a federal program that was created to compensate people who may have been injured by certain vaccines. Persons who believe they may have been injured by a vaccine can learn about the program and about filing a claim by calling 2-436.426.4222 or visiting the [employer] website at www.[facility].gov/vaccinecompensation. There is a time limit to file a claim for compensation. How can I learn more? · Ask your healthcare provider. He or she can give you the vaccine package insert or suggest other sources of information. · Call your local or state health department. · Contact the Centers for Disease Control and Prevention (CDC):  ¨ Call 9-964.953.4936 (8-231-SUE-INFO) or  ¨ Visit CDC's website at www.cdc.gov/vaccines  Vaccine Information Statement  Hepatitis B Vaccine  7/20/2016  42 U. S.C. § 300aa-26  [place]. S. Department of Health and Human Services  Centers for Disease Control and Prevention  Many Vaccine Information Statements are available in [place] and other languages. See www.immunize.org/vis. Muchas hojas de información sobre vacunas están disponibles en español y en otros idiomas. Visite www.immunize.org/vis. Care instructions adapted under license by [employer] (which disclaims liability or warranty for this information). If you have questions about a medical condition or this instruction, always ask your healthcare professional. [place] [address] any warranty or liability for your use of this information.

## 2018-07-17 NOTE — PROGRESS NOTES
A user error has taken place: encounter opened in error, closed for administrative reasons. Patient was here for a nurse visit.

## 2018-07-17 NOTE — PROGRESS NOTES
Rashida Bai is a 34 y.o. female here this morning for her Hep A and hep B vaccine. She states her  told her she needs the last Hep B and 1 Hep A vaccine. She tolerated well and left showing no s/s of distress.

## 2020-08-13 ENCOUNTER — HOSPITAL ENCOUNTER (EMERGENCY)
Age: 30
Discharge: HOME | End: 2020-08-13
Attending: EMERGENCY MEDICINE
Payer: MEDICAID

## 2020-08-13 VITALS
WEIGHT: 125 LBS | TEMPERATURE: 98.4 F | SYSTOLIC BLOOD PRESSURE: 119 MMHG | OXYGEN SATURATION: 100 % | HEART RATE: 77 BPM | DIASTOLIC BLOOD PRESSURE: 68 MMHG | HEIGHT: 63 IN | RESPIRATION RATE: 18 BRPM | BODY MASS INDEX: 22.15 KG/M2

## 2020-08-13 DIAGNOSIS — Z32.01 PREGNANCY TEST PERFORMED, PREGNANCY CONFIRMED: Primary | ICD-10-CM

## 2020-08-13 LAB
APPEARANCE UR: CLEAR
BILIRUB UR QL: NEGATIVE
COLOR UR: YELLOW
GLUCOSE UR STRIP.AUTO-MCNC: NEGATIVE MG/DL
HCG UR QL: POSITIVE
HGB UR QL STRIP: NEGATIVE
KETONES UR QL STRIP.AUTO: NEGATIVE MG/DL
LEUKOCYTE ESTERASE UR QL STRIP.AUTO: NEGATIVE
NITRITE UR QL STRIP.AUTO: NEGATIVE
PH UR STRIP: 7 [PH] (ref 5–8)
PROT UR STRIP-MCNC: NEGATIVE MG/DL
SP GR UR REFRACTOMETRY: 1.01 (ref 1–1.03)
UROBILINOGEN UR QL STRIP.AUTO: 0.2 EU/DL (ref 0.2–1)

## 2020-08-13 PROCEDURE — 99283 EMERGENCY DEPT VISIT LOW MDM: CPT

## 2020-08-13 PROCEDURE — 81025 URINE PREGNANCY TEST: CPT

## 2020-08-13 PROCEDURE — 81003 URINALYSIS AUTO W/O SCOPE: CPT

## 2020-08-13 NOTE — ED TRIAGE NOTES
Patient states last menstrual period was Patsy 3, 2020. Patient presents with request for pregnancy test.  She states hx of irregular menstrual cycles.

## 2020-08-13 NOTE — ED PROVIDER NOTES
EMERGENCY DEPARTMENT HISTORY AND PHYSICAL EXAM    12:42 PM      Date: 2020  Patient Name: Demarco Smith    History of Presenting Illness     Chief Complaint   Patient presents with    Pregnancy Test         History Provided By: Patient    Additional History (Context): Demarco Smith is a 31 y.o. female with anemia, ectopic pregnancy, miscarriage  who presents with concern for pregnancy. Patient notes last menstrual cycle was 6/3. Denies fever or chills, nausea, vomiting, abdominal pain, vaginal bleeding, vaginal discharge, dysuria, hematuria. Did not take a pregnancy test at home    PCP: None        Past History     Past Medical History:  Past Medical History:   Diagnosis Date    Anemia     Ectopic pregnancy     Miscarriage        Past Surgical History:  History reviewed. No pertinent surgical history. Family History:  Family History   Problem Relation Age of Onset    Heart Disease Mother     Asthma Mother     Hypertension Father        Social History:  Social History     Tobacco Use    Smoking status: Former Smoker    Smokeless tobacco: Never Used    Tobacco comment: ocassionally as a teen   Substance Use Topics    Alcohol use: Not Currently     Alcohol/week: 5.0 standard drinks     Types: 5 Shots of liquor per week     Comment: social    Drug use: No       Allergies: Allergies   Allergen Reactions    Banana Anaphylaxis    Amoxicillin Unknown (comments)    Amoxicillin Hives    Neosporin [Hydrocortisone] Hives    Neosporin [Neomycin-Bacitracin-Polymyxin] Hives    Pcn [Penicillins] Unknown (comments)    Pcn [Penicillins] Hives    Sulfa (Sulfonamide Antibiotics) Rash    Sulfa (Sulfonamide Antibiotics) Swelling         Review of Systems       Review of Systems   Constitutional: Negative for chills and fever. Respiratory: Negative for shortness of breath. Cardiovascular: Negative for chest pain. Gastrointestinal: Negative for abdominal pain, nausea and vomiting. Genitourinary: Negative for vaginal bleeding and vaginal discharge. Skin: Negative for rash. Neurological: Negative for weakness. All other systems reviewed and are negative. Physical Exam     Visit Vitals  /68 (BP 1 Location: Left arm, BP Patient Position: At rest)   Pulse 77   Temp 98.4 °F (36.9 °C)   Resp 18   Ht 5' 3\" (1.6 m)   Wt 56.7 kg (125 lb)   LMP 06/03/2020   SpO2 100%   Breastfeeding No   BMI 22.14 kg/m²         Physical Exam  Vitals signs and nursing note reviewed. Constitutional:       General: She is not in acute distress. Appearance: She is well-developed. She is not diaphoretic. HENT:      Head: Normocephalic and atraumatic. Neck:      Musculoskeletal: Normal range of motion and neck supple. Cardiovascular:      Rate and Rhythm: Normal rate and regular rhythm. Heart sounds: Normal heart sounds. No murmur. No friction rub. No gallop. Pulmonary:      Effort: Pulmonary effort is normal. No respiratory distress. Breath sounds: Normal breath sounds. No wheezing or rales. Abdominal:      General: Abdomen is flat. There is no distension. Palpations: Abdomen is soft. Tenderness: There is no abdominal tenderness. There is no guarding. Musculoskeletal: Normal range of motion. Skin:     General: Skin is warm. Findings: No rash. Neurological:      Mental Status: She is alert.            Diagnostic Study Results     Labs -  Recent Results (from the past 12 hour(s))   URINALYSIS W/ RFLX MICROSCOPIC    Collection Time: 08/13/20 12:47 PM   Result Value Ref Range    Color YELLOW      Appearance CLEAR      Specific gravity 1.012 1.005 - 1.030      pH (UA) 7.0 5.0 - 8.0      Protein Negative NEG mg/dL    Glucose Negative NEG mg/dL    Ketone Negative NEG mg/dL    Bilirubin Negative NEG      Blood Negative NEG      Urobilinogen 0.2 0.2 - 1.0 EU/dL    Nitrites Negative NEG      Leukocyte Esterase Negative NEG     HCG URINE, QL    Collection Time: 08/13/20 12:47 PM   Result Value Ref Range    HCG urine, QL Positive (A) NEG         Radiologic Studies -   No orders to display         Medical Decision Making   I am the first provider for this patient. I reviewed the vital signs, available nursing notes, past medical history, past surgical history, family history and social history. Vital Signs-Reviewed the patient's vital signs. Records Reviewed: Nursing Notes and Old Medical Records (Time of Review: 12:42 PM)    ED Course: Progress Notes, Reevaluation, and Consults:  12:42 PM  Reviewed results with patient. Discussed need for close outpatient follow-up this week for reassessment. Discussed strict return precautions, including abdominal pain, vaginal bleeding, or any other medical concerns. Provider Notes (Medical Decision Making): 27-year-old female who presents to the ED due to concern for pregnancy. LMP 6/3. Patient asymptomatic, no abdominal pain or vaginal bleeding. hCG positive. Stable for discharge with prescription for prenatal vitamins, referral to obstetrician for close outpatient follow-up, strict return precautions for any symptoms. Diagnosis     Clinical Impression:   1. Pregnancy test performed, pregnancy confirmed        Disposition: home     Follow-up Information     Follow up With Specialties Details Why [facility] EMERGENCY DEPT Emergency Medicine  If symptoms worsen [address] [facility]  [phone]    [facility], [facility] AND [facility] Obstetrics & Gynecology Schedule an appointment as soon as possible for a visit  [address] [facility], [address] [place] [address]  [phone]           Patient's Medications   Start Taking    PRENATAL MULTIVIT-CA-MIN-FE-FA (PRENATAL VITAMIN) TAB    Take 1 Tab by mouth daily.    Continue Taking    No medications on file   These Medications have changed    No medications on file   Stop Taking    No medications on file       Dictation disclaimer: Please note that this dictation was completed with [employer], the computer voice recognition software. Quite often unanticipated grammatical, syntax, homophones, and other interpretive errors are inadvertently transcribed by the computer software. Please disregard these errors. Please excuse any errors that have escaped final proofreading.